# Patient Record
Sex: FEMALE | URBAN - METROPOLITAN AREA
[De-identification: names, ages, dates, MRNs, and addresses within clinical notes are randomized per-mention and may not be internally consistent; named-entity substitution may affect disease eponyms.]

---

## 2022-03-21 ENCOUNTER — HISTORICAL (OUTPATIENT)
Dept: PREADMISSION TESTING | Facility: HOSPITAL | Age: 57
End: 2022-03-21

## 2022-03-23 ENCOUNTER — HISTORICAL (OUTPATIENT)
Dept: ADMINISTRATIVE | Facility: HOSPITAL | Age: 57
End: 2022-03-23

## 2022-03-23 ENCOUNTER — HISTORICAL (OUTPATIENT)
Dept: SURGERY | Facility: HOSPITAL | Age: 57
End: 2022-03-23

## 2022-03-24 LAB
ABS NEUT (OLG): 8.91 (ref 2.1–9.2)
ALBUMIN SERPL-MCNC: 3.1 G/DL (ref 3.5–5)
ALBUMIN/GLOB SERPL: 1 {RATIO} (ref 1.1–2)
ALP SERPL-CCNC: 74 U/L (ref 40–150)
ALT SERPL-CCNC: 47 U/L (ref 0–55)
AST SERPL-CCNC: 47 U/L (ref 5–34)
BASOPHILS # BLD AUTO: 0 10*3/UL (ref 0–0.2)
BASOPHILS NFR BLD AUTO: 0 %
BILIRUB SERPL-MCNC: 0.3 MG/DL
BILIRUBIN DIRECT+TOT PNL SERPL-MCNC: 0.1 (ref 0–0.5)
BILIRUBIN DIRECT+TOT PNL SERPL-MCNC: 0.2 (ref 0–0.8)
BUN SERPL-MCNC: 13.1 MG/DL (ref 9.8–20.1)
CALCIUM SERPL-MCNC: 9.1 MG/DL (ref 8.7–10.5)
CHLORIDE SERPL-SCNC: 105 MMOL/L (ref 98–107)
CO2 SERPL-SCNC: 28 MMOL/L (ref 22–29)
CREAT SERPL-MCNC: 0.91 MG/DL (ref 0.55–1.02)
ERYTHROCYTE [DISTWIDTH] IN BLOOD BY AUTOMATED COUNT: 14.9 % (ref 11.5–17)
GLOBULIN SER-MCNC: 3.1 G/DL (ref 2.4–3.5)
GLUCOSE SERPL-MCNC: 122 MG/DL (ref 74–100)
HCT VFR BLD AUTO: 40.6 % (ref 37–47)
HEMOLYSIS INTERF INDEX SERPL-ACNC: 2
HGB BLD-MCNC: 12.8 G/DL (ref 12–16)
ICTERIC INTERF INDEX SERPL-ACNC: 0
LIPEMIC INTERF INDEX SERPL-ACNC: <0
LYMPHOCYTES # BLD AUTO: 0.7 10*3/UL (ref 0.6–4.6)
LYMPHOCYTES NFR BLD AUTO: 7 %
MANUAL DIFF? (OHS): NO
MCH RBC QN AUTO: 31.9 PG (ref 27–31)
MCHC RBC AUTO-ENTMCNC: 31.5 G/DL (ref 33–36)
MCV RBC AUTO: 101.2 FL (ref 80–94)
MONOCYTES # BLD AUTO: 0.4 10*3/UL (ref 0.1–1.3)
MONOCYTES NFR BLD AUTO: 4 %
NEUTROPHILS # BLD AUTO: 8.91 10*3/UL (ref 2.1–9.2)
NEUTROPHILS NFR BLD AUTO: 88 %
PLATELET # BLD AUTO: 182 10*3/UL (ref 130–400)
PMV BLD AUTO: 10.5 FL (ref 9.4–12.4)
POTASSIUM SERPL-SCNC: 4.8 MMOL/L (ref 3.5–5.1)
PROT SERPL-MCNC: 6.2 G/DL (ref 6.4–8.3)
RBC # BLD AUTO: 4.01 10*6/UL (ref 4.2–5.4)
SODIUM SERPL-SCNC: 143 MMOL/L (ref 136–145)
WBC # SPEC AUTO: 10.1 10*3/UL (ref 4.5–11.5)

## 2022-05-14 NOTE — OP NOTE
Patient:   Stacey Leblanc            MRN: [id number]            FIN: [id number]               Age:   56 years     Sex:  Female     :  1965   Associated Diagnoses:   Cholelithiasis; Calculous cholecystitis   Author:   David Matos MD      Operative Note   Operative Information   Procedures Performed.     Indications.     Preoperative Diagnosis: Cholelithiasis (THF80-DL K80.20), Calculous cholecystitis (RDI48-YP K80.10).     Postoperative Diagnosis: Cholelithiasis (SHO37-NC K80.20), Calculous cholecystitis (OSC82-CP K80.10).     Surgeon: David Matos MD.     Assistant: Gauri Zacarias     Anesthesia: Gen..     Speciman Removed: gallbladder.     Description of Procedure/Findings/    Complications:     The patient was taken to the operating room. Patient was placed under general anesthesia. Abdomen was prepped and draped in the usual sterile fashion. An appropriate timeout was performed. Optical tipped trocar was used to access the peritoneal cavity. Pneumoperitoneum was instituted. Abdominal exploration was negative. Gallbladder was noted and held in a cephalad direction. The infundibulum was noted and held in a lateral direction. The peritoneum overlying the infundibulum was dissected revealing the cystic duct gallbladder junction and the cystic artery. The critical view was obtained. The cystic duct and cystic artery were clipped and transected. The gallbladder was removed from the undersurface of the liver with sharp and electric dissection. Hemostasis was assured. The clips were in excellent position and there was no bleeding or leakage of bile. Gallbladder was completely removed from the liver hemostasis was assured. The gallbladder was removed from the abdomen. Once again hemostasis was assured the operative site was irrigated until clear. Trochars were removed and pneumoperitoneum released the incisions were closed with Vicryl..     Esimated blood loss: loss less than  15  cc.      Complications: None.

## 2022-05-14 NOTE — H&P
Patient:   Stacey Leblanc            MRN: [id number]            FIN: [id number]               Age:   56 years     Sex:  Female     :  1965   Associated Diagnoses:   Cholelithiasis   Author:   Gauri Zacarias      Basic Information   History limitation:  None.       Chief Complaint   Abd pain      History of Present Illness   Ms. Leblanc is a 55 yo female that presents to [facility] for cholecystectomy. Hx of symptomatic cholelithiasis.       Review of Systems   Constitutional:  Negative.    Eye:  Negative.    Ear/Nose/Mouth/Throat:  Negative.    Respiratory:  Negative.    Cardiovascular:  Negative.    Gastrointestinal:  Abdominal pain.    Genitourinary:  Negative.    Gynecologic:  Negative.    Hematology/Lymphatics:  Negative.    Endocrine:  Negative.    Immunologic:  Negative.    Musculoskeletal:  Negative.    Integumentary:  Negative.    Neurologic:  Negative.    Psychiatric:  Negative.       Health Status   Allergies:    Allergic Reactions (Selected)  Severity Not Documented  Sulfa drugs- Rash.   Current medications:  (Selected)   Inpatient Medications  Ordered  Ancef: 1 gm, form: Injection Surgical prophylaxis, IV Piggyback, On Call, Infuse over: 30 minute(s), first dose 22 12:11:00 CDT  Buffered Lidocaine 1% - 1mL syringe: 0.5 mL, 5 mg =, form: Injection, ID, As Directed PRN for other (see comment), first dose 22 12:11:00 CDT, May inject 0.5mL at IV site, if not allergic  Lactated Ringers 1000ml 1,000 mL: 1,000 mL, 1,000 mL, IV, 125 ml/hr, start date 22 12:11:00 CDT, 1.84, m2  Lactated Ringers Injection intravenous solution 1,000 mL: 1,000 mL, 1,000 mL, IV, 75 mL/hr, start date 22 12:11:00 CDT  Neurontin 300 mg oral capsule: 300 mg, form: Cap, Oral, Once-Unscheduled, first dose 22 12:11:00 CDT, in holding preop  midazolam: 1 mg, form: Injection, IV Push, q5min PRN for anxiety, Order duration: 2 dose(s), first dose 22 12:11:00 CDT, stop date Limited # of times,  STAT, may repeat x1 in 5 minutes if anxiety not relieved.  Hold  if pregnancy test is pending.  (ADULT PA...  ondansetron: 4 mg, form: Tab-Dis, Oral, Pre Op, first dose 03/23/22 13:11:00 CDT, stop date 03/23/22 13:11:00 CDT, give one dose 1 hour pre op  traMADol: 50 mg, form: Tab, Oral, Once, first dose 03/23/22 14:00:00 CDT, stop date 03/23/22 14:00:00 CDT  Documented Medications  Documented  allopurinol 100 mg oral tablet: 100 mg = 1 tab(s), Oral, Daily, # 30 tab(s), 0 Refill(s)  levothyroxine 88 mcg (0.088 mg) oral tablet: 88 mcg = 1 tab(s), Oral, Daily, # 30 tab(s), 0 Refill(s)  pantoprazole 20 mg ORAL EC-Tablet: 20 mg = 1 tab(s), Oral, Daily, # 30 tab(s), 0 Refill(s)   Problem list:    All Problems  Cholelithiasis / SNOMED CT 088036554 / Confirmed  Sleep apnea / SNOMED CT 994632921 / Confirmed  compliant w cpap  Hypothyroidism / SNOMED CT 80737279 / Confirmed  N&V - Nausea and vomiting / SNOMED CT 4265877119 / Confirmed  intermittently since nov 2021  Gout / SNOMED CT 285864202 / Confirmed  Acid reflux / SNOMED CT 6745329218 / Confirmed  Downs syndrome / SNOMED CT 9211803438 / Confirmed      Histories   Past Medical History:    No active or resolved past medical history items have been selected or recorded.   Family History:    No family history items have been selected or recorded.   Procedure history:    Tonsillectomy and adenoidectomy (SNOMED CT 329329225).  Comments:  3/22/2022 10:57 CDT - Tosin DAILEY, Alessandra BRADLEY  age 10   Social History        Social & Psychosocial Habits    Alcohol  03/22/2022  Use: Never    Employment/School  03/22/2022  Description: not employed-pt has downs    Comment: [employer] [facility] [employer] daily for activities - 03/22/2022 11:11 - Alessandra Leahy RN      Comment: completed school at [employer] - 03/22/2022 11:12 - Tosin RN, Alessandra H.    Home/Environment  03/22/2022  Lives with: Mother    Home equipment: CPAP/BiPAP    Nutrition/Health  03/22/2022  Home Diet Regular    Comment: recent n/v w  gallbladder disease - 03/22/2022 11:13 - Tosin DAILEY, Alessandra BRADLEY    Substance Use  03/22/2022  Use: Never    Tobacco  03/22/2022  Use: Never (less than 100 in l    Patient Wants Consult For Cessation Counseling N/A    03/23/2022  Use: Never (less than 100 in l    Patient Wants Consult For Cessation Counseling N/A    Abuse/Neglect  03/22/2022  SHX Any signs of abuse or neglect No    Feels unsafe at home: No    Safe place to go: Yes    03/23/2022  SHX Any signs of abuse or neglect No    Spiritual/Cultural  03/22/2022  Mu-ism Preference Sabianist  .        Physical Examination   Vital Signs   3/23/2022 12:30 CDT      Temperature Oral          36.7 DegC                             Temperature Oral (calculated)             98.06 DegF                             Heart Rate Monitored      56 bpm  LOW                             Respiratory Rate          18 br/min                             SpO2                      94 %                             Oxygen Therapy            Room air                             Systolic Blood Pressure   114 mmHg                             Diastolic Blood Pressure  78 mmHg                             Mean Arterial Pressure, Cuff              90 mmHg     Measurements from flowsheet : Measurements   3/23/2022 12:19 CDT      Weight Dosing             79.5 kg                             Weight Measured           79.5 kg                             Weight Measured and Calculated in Lbs     175.27 lb                             Height/Length Dosing      144.78 cm                             Height/Length Measured    144.78 cm                             Body Mass Index Measured  37.93 kg/m2    3/22/2022 11:05 CDT      Weight Estimated          80.73 kg                             Height/Length Estimated   144.78 cm                             Body Mass Index Estimated 38.51 kg/m2    3/22/2022 11:03 CDT      Weight Measured           80.73 kg                             Height/Length Measured     144.78 cm                             Body Mass Index Measured  38.51 kg/m2     General:  Alert and oriented, No acute distress.    Respiratory:  Lungs are clear to auscultation, Respirations are non-labored.    Cardiovascular:  Normal rate, Regular rhythm.    Gastrointestinal:  Soft, Non-tender.    Neurologic:  Alert, Oriented.    Psychiatric:  Cooperative, Appropriate mood & affect.       Impression and Plan   Diagnosis     Cholelithiasis (KFD67-KK K80.20).     Lap zunilda, possible IOC, and other indicated procedures  Dr. Matos examined patient, obtained informed consent, and answered all questions.